# Patient Record
Sex: FEMALE | Race: BLACK OR AFRICAN AMERICAN | NOT HISPANIC OR LATINO | Employment: UNEMPLOYED | URBAN - METROPOLITAN AREA
[De-identification: names, ages, dates, MRNs, and addresses within clinical notes are randomized per-mention and may not be internally consistent; named-entity substitution may affect disease eponyms.]

---

## 2024-06-22 ENCOUNTER — ON-DEMAND VIRTUAL (OUTPATIENT)
Dept: URGENT CARE | Facility: CLINIC | Age: 19
End: 2024-06-22

## 2024-06-22 DIAGNOSIS — R11.0 NAUSEA: ICD-10-CM

## 2024-06-22 DIAGNOSIS — N92.6 MISSED MENSES: Primary | ICD-10-CM

## 2024-06-22 RX ORDER — ONDANSETRON 4 MG/1
4 TABLET, ORALLY DISINTEGRATING ORAL EVERY 8 HOURS PRN
Qty: 20 TABLET | Refills: 0 | Status: SHIPPED | OUTPATIENT
Start: 2024-06-22

## 2024-06-22 NOTE — PROGRESS NOTES
Subjective:      Patient ID: Madelyn Santoyo is a 19 y.o. female.    Vitals:  vitals were not taken for this visit.     Chief Complaint: Amenorrhea (Nausea/Vaginal discharge)      Visit Type: TELE AUDIOVISUAL    Present with the patient at the time of consultation: TELEMED PRESENT WITH PATIENT: None        History reviewed. No pertinent past medical history.  History reviewed. No pertinent surgical history.  Review of patient's allergies indicates:  No Known Allergies  No current outpatient medications on file prior to visit.     No current facility-administered medications on file prior to visit.     No family history on file.    Medications Ordered                [id number] - [place] - [address] [facility]   [address], [place] [address]    Telephone: [phone]   Fax: [phone]   Hours: Not open 24 hours                         E-Prescribed (1 of 1)              ondansetron (ZOFRAN-ODT) 4 MG TbDL    Sig: Take 1 tablet (4 mg total) by mouth every 8 (eight) hours as needed (nausea).       Start: 6/22/24     Quantity: 20 tablet Refills: 0                           Ohs Peq Odvv Intake    6/22/2024 10:49 AM CDT - Filed by Patient   What is your current physical address in the event of a medical emergency? [id number]   Are you able to take your vital signs? No   Please attach any relevant images or files          18 yo female with c/o light menses last month and two weeks and now one week late. She states last one only spotting. She states she does have cramping. She does have nausea with occasional vomiting. She states she did three tests and one was positive. She states most recent one positive. She states states she has clumpy and white discharge and odor. She denies concern for std.       Constitution: Negative.   HENT: Negative.     Cardiovascular: Negative.    Respiratory: Negative.     Gastrointestinal: Negative.    Endocrine: negative.    Genitourinary:  Positive for missed menses. Negative for frequency and urgency.   Musculoskeletal: Negative.    Skin: Negative.    Allergic/Immunologic: Negative.    Neurological: Negative.    Hematologic/Lymphatic: Negative.    Psychiatric/Behavioral: Negative.          Objective:   The physical exam was conducted virtually.  LOCATION OF PATIENT home  Physical Exam   Constitutional: She is oriented to person, place, and time. She appears well-developed.   HENT:   Head: Normocephalic and atraumatic.   Ears:   Right Ear: Hearing, tympanic membrane and external ear normal.   Left Ear: Hearing, tympanic membrane and external ear normal.   Nose: Nose normal.   Mouth/Throat: Uvula is midline, oropharynx is clear and moist and mucous membranes are normal.   Eyes: Conjunctivae and EOM are normal. Pupils are equal, round, and reactive to light.   Neck: Neck supple.   Cardiovascular: Normal rate.   Pulmonary/Chest: Effort normal and breath sounds normal.   Musculoskeletal: Normal range of motion.         General: Normal range of motion.   Neurological: She is alert and oriented to person, place, and time.   Skin: Skin is warm.   Psychiatric: Her behavior is normal. Thought content normal.   Nursing note and vitals reviewed.      Assessment:     1. Missed menses    2. Nausea        Plan:     Try otc monostat - and if does not improve follow up in urgent care for testing.  Call ob gyn for possible beta hcg testing  Start otc prenatal vitamins    Missed menses  -     ondansetron (ZOFRAN-ODT) 4 MG TbDL; Take 1 tablet (4 mg total) by mouth every 8 (eight) hours as needed (nausea).  Dispense: 20 tablet; Refill: 0    Nausea  -     ondansetron (ZOFRAN-ODT) 4 MG TbDL; Take 1 tablet (4 mg total) by mouth every 8 (eight) hours as needed (nausea).  Dispense: 20 tablet; Refill: 0

## 2024-07-12 ENCOUNTER — ON-DEMAND VIRTUAL (OUTPATIENT)
Dept: URGENT CARE | Facility: CLINIC | Age: 19
End: 2024-07-12

## 2024-07-12 DIAGNOSIS — O21.9 VOMITING DURING PREGNANCY: Primary | ICD-10-CM

## 2024-07-12 NOTE — PROGRESS NOTES
Subjective:      Patient ID: Madelyn Santoyo is a 19 y.o. female.    Vitals:  vitals were not taken for this visit.     Chief Complaint: Hyperemesis Gravidarum      Visit Type: TELE AUDIOVISUAL    Present with the patient at the time of consultation: TELEMED PRESENT WITH PATIENT: None    History reviewed. No pertinent past medical history.  History reviewed. No pertinent surgical history.  Review of patient's allergies indicates:  No Known Allergies  Current Outpatient Medications on File Prior to Visit   Medication Sig Dispense Refill    doxylamine succinate 25 mg tablet Take 1 tablet (25 mg total) by mouth 2 (two) times a day. 60 tablet 2    ondansetron (ZOFRAN-ODT) 4 MG TbDL Take 1 tablet (4 mg total) by mouth every 8 (eight) hours as needed (nausea). 20 tablet 0    PNV,calcium 72/iron/folic acid (PRENATAL VITAMIN) Tab Take 1 tablet by mouth once daily. 30 tablet 11    pyridoxine, vitamin B6, (B-6) 25 MG Tab Take 2 tablets (50 mg total) by mouth 2 (two) times a day. 60 tablet 2     No current facility-administered medications on file prior to visit.     No family history on file.        Ohs Peq Odvv Intake    7/12/2024 12:08 PM CDT - Filed by Patient   What is your current physical address in the event of a medical emergency? [address]   Are you able to take your vital signs? No   Please attach any relevant images or files          In [place] via video conference.     20 y/o woman who just found out she is pregnant and has not seen her OB yet has been having morning sickness with vomiting for several days. Today she vomited and said there was blood in her vomit that she has not had before. She has not been seen yet for this pregnancy. No fever.         Constitution: Negative for fever.   Gastrointestinal:  Positive for nausea and vomiting.        Objective:   The physical exam was conducted virtually.  Physical Exam   HENT:   Nose: No congestion.   Pulmonary/Chest: Effort normal. No stridor. No  respiratory distress.         Comments: Normal verbal lexii without respiratory compromise.     Neurological: She is alert.   No other pertinent findings on examination.     Assessment:     1. Vomiting during pregnancy        Plan:       Vomiting during pregnancy    With you not having your OB exam yet and the vomiting having blood in it being new, I recommend you go into urgent care for evaluation. She is agreeable to this.

## 2024-07-19 LAB — PAP RECOMMENDATION EXT: NORMAL

## 2024-07-23 PROBLEM — Z98.890 S/P D&C (STATUS POST DILATION AND CURETTAGE): Status: ACTIVE | Noted: 2024-07-23

## 2024-07-23 PROBLEM — Z98.890 POST-OPERATIVE STATE: Status: ACTIVE | Noted: 2024-07-23

## 2024-08-17 ENCOUNTER — ON-DEMAND VIRTUAL (OUTPATIENT)
Dept: URGENT CARE | Facility: CLINIC | Age: 19
End: 2024-08-17
Payer: MEDICAID

## 2024-08-17 DIAGNOSIS — R07.9 CHEST PAIN, UNSPECIFIED TYPE: Primary | ICD-10-CM

## 2024-08-17 DIAGNOSIS — N93.9 EPISODE OF HEAVY VAGINAL BLEEDING: ICD-10-CM

## 2024-08-17 PROCEDURE — 99212 OFFICE O/P EST SF 10 MIN: CPT | Mod: 95,,, | Performed by: NURSE PRACTITIONER

## 2024-08-18 NOTE — PROGRESS NOTES
Subjective:      Patient ID: Madelyn Santoyo is a 19 y.o. female.    Vitals:  vitals were not taken for this visit.     Chief Complaint: Abdominal Pain      Visit Type: TELE AUDIOVISUAL    Present with the patient at the time of consultation: TELEMED PRESENT WITH PATIENT: None, at home    History reviewed. No pertinent past medical history.  Past Surgical History:   Procedure Laterality Date    DILATION AND CURETTAGE OF UTERUS USING SUCTION N/A 7/23/2024    Procedure: DILATION AND CURETTAGE, UTERUS, USING SUCTION;  Surgeon: Tyesha Benitez MD;  Location: [facility] MAIN OR;  Service: OB/GYN;  Laterality: N/A;     Review of patient's allergies indicates:  No Known Allergies  Current Outpatient Medications on File Prior to Visit   Medication Sig Dispense Refill    docusate sodium (COLACE) 100 MG capsule Take 1 capsule (100 mg total) by mouth 2 (two) times daily as needed for Constipation. 60 capsule 0    HYDROcodone-acetaminophen (NORCO) 5-325 mg per tablet Take 1 tablet by mouth every 6 (six) hours as needed for Pain. 10 tablet 0    ibuprofen (ADVIL,MOTRIN) 600 MG tablet Take 1 tablet (600 mg total) by mouth every 8 (eight) hours as needed for Pain. 30 tablet 0    ondansetron (ZOFRAN-ODT) 4 MG TbDL Take 1 tablet (4 mg total) by mouth every 8 (eight) hours as needed (nausea). 20 tablet 0    PNV,calcium 72/iron/folic acid (PRENATAL VITAMIN) Tab Take 1 tablet by mouth once daily. 30 tablet 11    pyridoxine, vitamin B6, (B-6) 25 MG Tab Take 2 tablets (50 mg total) by mouth 2 (two) times a day. 60 tablet 2     No current facility-administered medications on file prior to visit.     Family History   Problem Relation Name Age of Onset    Colon cancer Maternal Grandmother          unsure age    Breast cancer Neg Hx      Ovarian cancer Neg Hx      Endometrial cancer Neg Hx      Pancreatic cancer Neg Hx             Ohs Peq Odvv Intake    8/17/2024  8:40 PM CDT - Filed by Patient   What is your current physical  address in the event of a medical emergency? [address]   Are you able to take your vital signs? No   Please attach any relevant images or files          Menstrual bleeding. Spotting initially, cramping and abdominal pain. Now heavy bleeding and passing blood clots. Pain 8/10. Taking Ibuprofen with little relief. Also reports chest pain and nausea.    Abdominal Pain  Associated symptoms include nausea.       Cardiovascular:  Positive for chest pain and sob on exertion.   Gastrointestinal:  Positive for abdominal pain and nausea.   Genitourinary:  Positive for painful menstruation and heavy menstrual bleeding.        Objective:   The physical exam was conducted virtually.  Physical Exam   Constitutional: She is oriented to person, place, and time. She does not appear ill. No distress.   HENT:   Head: Normocephalic and atraumatic.   Nose: Nose normal.   Eyes: Extraocular movement intact   Pulmonary/Chest: Effort normal.   Abdominal: Normal appearance.   Musculoskeletal: Normal range of motion.         General: Normal range of motion.   Neurological: no focal deficit. She is alert and oriented to person, place, and time.   Psychiatric: Her behavior is normal. Mood normal.   Vitals reviewed.      Assessment:     1. Chest pain, unspecified type    2. Episode of heavy vaginal bleeding        Plan:   Given history and symptoms further in-person evaluation is needed for a diagnosis and treatment plan.    Patient encouraged to monitor symptoms closely and instructed to follow-up for new or worsening symptoms. Further, in-person, evaluation is necessary for continued treatment. Please follow up in Urgent Care, ER, or  with your primary care doctor or specialist as needed. Verbally discussed plan. Patient confirms understanding and is in agreement with treatment and plan.     You must understand that you've received a [facility] Care evaluation only and that you may be released before all your medical problems are known or  treated. You, the patient, will arrange for follow-up care as instructed.      Chest pain, unspecified type    Episode of heavy vaginal bleeding      Patient Instructions   Patient Education       Heavy Periods Discharge Instructions   About this topic   The uterus is the organ where a baby grows when you are pregnant. The uterus is also called the womb. If you do not get pregnant, you get rid of the lining of the uterus each time you have your period. Normally, your body sheds the bloody lining over 3 to 7 days. Most women lose about 2 to 3 tablespoons (30 to 45 mL) of blood each month.  Many things can cause heavy periods. Some are serious things like bleeding disorders or cancer of your uterus. Less serious things like problems with your thyroid, fibroids, side effects from your medicines, or infrequent periods can also cause heavy bleeding during your period.  Treatment may depend on the cause of heavy periods. Treatment is also given to prevent too much blood loss. It can be treated with drugs and, in rare cases, surgery may be done. Sometimes, treatment is not needed.     What care is needed at home?   Ask your doctor what you need to do when you go home. Make sure you ask questions if you do not understand what you need to do.  Take all of your drugs as ordered by the doctor.  Heat may help with the pain. Use a heating pad for no more than 20 minutes at a time. Never go to sleep with a heating pad on as this can cause burns.  You may want to take medicines like ibuprofen, naproxen, or acetaminophen to help with pain.  Stay as active as you can. Exercise can help to lower the amount of pain you have.  What follow-up care is needed?   Your doctor may ask you to make visits to the office to check on your progress. Be sure to keep your visits.  You may need more tests to know what caused your heavy periods. The results will help your doctor understand what kind of treatment you may need. Together you can make a  plan for more care.  Your doctor may send you to a hormone expert if your heavy periods are caused by hormonal and thyroid problems. This kind of doctor is an endocrinologist.  What drugs may be needed?   The doctor may order drugs to:  Balance your hormones. Birth control pills are most often used to do this.  Thicken blood and slow bleeding  Help your periods become more regular, shorter, and lighter  Increase iron in your blood  Help with pain and swelling  Make your ovaries stop working for a while  Will physical activity be limited?   Heavy periods may make you uneasy and may be painful enough to stop or limit you from doing your normal activities. You may feel tired and want to stay at home from work or school.  Talk to your doctor about when you can safely have sex again.  What problems could happen?   Low red blood cells  Painful menstrual cramps  What can be done to prevent this health problem?   There may be no specific way to prevent heavy periods.  When do I need to call the doctor?   You are bleeding so much that you feel very weak or like you might pass out.  You have severe belly pain with your bleeding.  You develop chest discomfort or become short of breath.  You have bleeding that soaks through more than 2 sanitary pads or tampons in an hour for more than 2 hours.  You become very pale or your heart is racing all the time.  Your bleeding continues and you feel weak or become light-headed when you stand up.  You have a period that lasts for more than 8 days.  You soak through a pad or tampon every 1 to 2 hours each time you have a period.  You need to use both tampons and pads because you are bleeding so much.  You need to change your tampon or pad during the night.  You bleed between periods or have irregular periods that happen more or less often than once a month.  You have pain and bad cramps in your lower belly before or while you are bleeding.  You have any of the symptoms listed above and are  low in iron. Signs of being low in iron include:  Feeling weak.  Feeling tired.  Having headaches.  Having trouble breathing when you exercise.  Feeling your heart beat too fast when you exercise.  Teach Back: Helping You Understand   The Teach Back Method helps you understand the information we are giving you. After you talk with the staff, tell them in your own words what you learned. This helps to make sure the staff has described each thing clearly. It also helps to explain things that may have been confusing. Before going home, make sure you can do these:  I can tell you about my condition.  I can tell you what changes I need to make with my diet, drugs, or activities.  I can tell you what I will do if I pass large blood clots, my vaginal bleeding lasts more than a week, or I soak my sanitary pad or tampon each hour.  Where can I learn more?   Centers for Disease Control and Prevention  http://www.cdc.gov/ncbddd/blooddisorders/women/menorrhagia.html   Family Doctor  https://familydoctor.org/condition/abnormal-uterine-bleeding/?adfree=true   NHS Choices  http://www.nhs.uk/conditions/Periods-heavy/Pages/Introduction.aspx   Office on Womens Health  https://www.womenshealth.gov/menstrual-cycle/period-problems/#3   Last Reviewed Date   2021-06-16  Consumer Information Use and Disclaimer   This information is not specific medical advice and does not replace information you receive from your health care provider. This is only a brief summary of general information. It does NOT include all information about conditions, illnesses, injuries, tests, procedures, treatments, therapies, discharge instructions or life-style choices that may apply to you. You must talk with your health care provider for complete information about your health and treatment options. This information should not be used to decide whether or not to accept your health care providers advice, instructions or recommendations. Only your health care  provider has the knowledge and training to provide advice that is right for you.  Copyright   Copyright © 2021 [employer] and its affiliates and/or licensors. All rights reserved.

## 2024-08-18 NOTE — PATIENT INSTRUCTIONS
Patient Education       Heavy Periods Discharge Instructions   About this topic   The uterus is the organ where a baby grows when you are pregnant. The uterus is also called the womb. If you do not get pregnant, you get rid of the lining of the uterus each time you have your period. Normally, your body sheds the bloody lining over 3 to 7 days. Most women lose about 2 to 3 tablespoons (30 to 45 mL) of blood each month.  Many things can cause heavy periods. Some are serious things like bleeding disorders or cancer of your uterus. Less serious things like problems with your thyroid, fibroids, side effects from your medicines, or infrequent periods can also cause heavy bleeding during your period.  Treatment may depend on the cause of heavy periods. Treatment is also given to prevent too much blood loss. It can be treated with drugs and, in rare cases, surgery may be done. Sometimes, treatment is not needed.     What care is needed at home?   Ask your doctor what you need to do when you go home. Make sure you ask questions if you do not understand what you need to do.  Take all of your drugs as ordered by the doctor.  Heat may help with the pain. Use a heating pad for no more than 20 minutes at a time. Never go to sleep with a heating pad on as this can cause burns.  You may want to take medicines like ibuprofen, naproxen, or acetaminophen to help with pain.  Stay as active as you can. Exercise can help to lower the amount of pain you have.  What follow-up care is needed?   Your doctor may ask you to make visits to the office to check on your progress. Be sure to keep your visits.  You may need more tests to know what caused your heavy periods. The results will help your doctor understand what kind of treatment you may need. Together you can make a plan for more care.  Your doctor may send you to a hormone expert if your heavy periods are caused by hormonal and thyroid problems. This kind of doctor is an  endocrinologist.  What drugs may be needed?   The doctor may order drugs to:  Balance your hormones. Birth control pills are most often used to do this.  Thicken blood and slow bleeding  Help your periods become more regular, shorter, and lighter  Increase iron in your blood  Help with pain and swelling  Make your ovaries stop working for a while  Will physical activity be limited?   Heavy periods may make you uneasy and may be painful enough to stop or limit you from doing your normal activities. You may feel tired and want to stay at home from work or school.  Talk to your doctor about when you can safely have sex again.  What problems could happen?   Low red blood cells  Painful menstrual cramps  What can be done to prevent this health problem?   There may be no specific way to prevent heavy periods.  When do I need to call the doctor?   You are bleeding so much that you feel very weak or like you might pass out.  You have severe belly pain with your bleeding.  You develop chest discomfort or become short of breath.  You have bleeding that soaks through more than 2 sanitary pads or tampons in an hour for more than 2 hours.  You become very pale or your heart is racing all the time.  Your bleeding continues and you feel weak or become light-headed when you stand up.  You have a period that lasts for more than 8 days.  You soak through a pad or tampon every 1 to 2 hours each time you have a period.  You need to use both tampons and pads because you are bleeding so much.  You need to change your tampon or pad during the night.  You bleed between periods or have irregular periods that happen more or less often than once a month.  You have pain and bad cramps in your lower belly before or while you are bleeding.  You have any of the symptoms listed above and are low in iron. Signs of being low in iron include:  Feeling weak.  Feeling tired.  Having headaches.  Having trouble breathing when you exercise.  Feeling your  heart beat too fast when you exercise.  Teach Back: Helping You Understand   The Teach Back Method helps you understand the information we are giving you. After you talk with the staff, tell them in your own words what you learned. This helps to make sure the staff has described each thing clearly. It also helps to explain things that may have been confusing. Before going home, make sure you can do these:  I can tell you about my condition.  I can tell you what changes I need to make with my diet, drugs, or activities.  I can tell you what I will do if I pass large blood clots, my vaginal bleeding lasts more than a week, or I soak my sanitary pad or tampon each hour.  Where can I learn more?   Centers for Disease Control and Prevention  http://www.cdc.gov/ncbddd/blooddisorders/women/menorrhagia.html   Family Doctor  https://familydoctor.org/condition/abnormal-uterine-bleeding/?adfree=true   NHS Choices  http://www.nhs.uk/conditions/Periods-heavy/Pages/Introduction.aspx   Office on Womens Health  https://www.womenshealth.gov/menstrual-cycle/period-problems/#3   Last Reviewed Date   2021-06-16  Consumer Information Use and Disclaimer   This information is not specific medical advice and does not replace information you receive from your health care provider. This is only a brief summary of general information. It does NOT include all information about conditions, illnesses, injuries, tests, procedures, treatments, therapies, discharge instructions or life-style choices that may apply to you. You must talk with your health care provider for complete information about your health and treatment options. This information should not be used to decide whether or not to accept your health care providers advice, instructions or recommendations. Only your health care provider has the knowledge and training to provide advice that is right for you.  Copyright   Copyright © 2021 [employer] and its affiliates and/or  licensors. All rights reserved.

## 2024-10-01 ENCOUNTER — PATIENT OUTREACH (OUTPATIENT)
Dept: ADMINISTRATIVE | Facility: HOSPITAL | Age: 19
End: 2024-10-01
Payer: MEDICAID

## 2025-04-07 ENCOUNTER — PATIENT MESSAGE (OUTPATIENT)
Dept: ADMINISTRATIVE | Facility: OTHER | Age: 20
End: 2025-04-07
Payer: MEDICAID

## 2025-04-14 ENCOUNTER — PATIENT MESSAGE (OUTPATIENT)
Dept: ADMINISTRATIVE | Facility: OTHER | Age: 20
End: 2025-04-14
Payer: MEDICAID